# Patient Record
Sex: FEMALE | Race: WHITE | Employment: OTHER | URBAN - METROPOLITAN AREA
[De-identification: names, ages, dates, MRNs, and addresses within clinical notes are randomized per-mention and may not be internally consistent; named-entity substitution may affect disease eponyms.]

---

## 2017-01-06 ENCOUNTER — HOSPITAL ENCOUNTER (OUTPATIENT)
Dept: PHYSICAL THERAPY | Age: 42
Discharge: HOME | End: 2017-01-06
Payer: COMMERCIAL

## 2017-01-06 PROCEDURE — 97140 MANUAL THERAPY 1/> REGIONS: CPT

## 2017-01-06 PROCEDURE — 97014 ELECTRIC STIMULATION THERAPY: CPT

## 2017-01-06 PROCEDURE — 97110 THERAPEUTIC EXERCISES: CPT

## 2017-01-06 PROCEDURE — 97162 PT EVAL MOD COMPLEX 30 MIN: CPT

## 2017-01-06 NOTE — PROGRESS NOTES
PT LUMBAR EVAL AND TREATMENT     Patient Name: Dionisio Padilla  Date:2017  : 1975  [x]  Patient  Verified  Payor: Hamzah Holman / Plan: [place] [employer] / Product Type: Commerical /    In time:1011  Out time:1109  Total Treatment Time (min): 58  Visit #: 1 of 8-12    Treatment Area: Chronic back pain [M54.9, G89.29]    SUBJECTIVE  Pain Level (0-10 scale): (C): 5  (B):   0 (W):  8  Any medication changes, allergies to medications, diagnosis change, or new procedure performed: see summary sheet for update  Subjective functional status/changes  CHIEF COMPLAINT: Patient reports with co LS pain starting approx 2 years ago after heavy lift with twisting of water bottle. Patient has been getting regular massages for several months, myofascial release balls to the L QL which aids relief, stretching and heat but temporarily. Patient also co R ant hip pain likely from compensatory gait.    Functional Status  Present functional limitations: walking tolerance - 1 min before onset of pain, standing tolerance limited, rec activities/ social activities, gym workouts    Mechanism of injury:lifting and twisting   Symptoms:  Aggravated by: see functional limitations   Eased by: heat, massage, stretching     Past History/Treatments:  Nothing for LS  Diagnostic Tests: None     OBJECTIVE  Posture:  Lateral Shift: negative      Gait:  WNL     Active Movements:  ROM % AROM Comments:pain, area   Forward flexion 40-60 WNL     Extension 20-30 WNL Pain in L flank     SB right 20-30     SB left 20-30  Pain in L flank   Rotation right 5-10     Rotation left 5-10  Pain in L flank        Dural Mobility:  Seated slump test negative - denies radiating sx     Palpation LQL     Strength  Hip : NT   Core: pain with 50% bridge    Special Tests    Sacroilliac:  SLIGHT UPSLIP IN SUPINE L     Standing forward flexion test: slight hypomobility of L     Crests: WNL              Hip: Beth Test NT    Piriformis: Mild L           Deficits: Franco's: NT     Luigi: NT     Hamstrings 90/90: WNL     Gastrocsoleus WNL     Other tests/comments:    Manual 13 min: IMT to L QL - multiple locations   Time out and written consent complete at 1030 am      Modality (rationale): decrease m tension   [x]  E-Stim: type HV 4 leads to L QL  x 10 min        [x]w/heat unattended semi reclined       Patient Education: [x]Established HEP , IMT post treatment recovery    [x] POT  (minutes) :10    Pain Level (0-10 scale) post treatment: 5    ASSESSMENT  [x]  See Plan of Care    PLAN  [x]  Upgrade activities as tolerated      [x] Other:_  POC 2x 8-12  Parker Alexandria, PT 1/6/2017  6:35 AM    Justification for Eval Code Complexity:  Patient History : chronicity   Examination see exam as above   Clinical Presentation: evolving - progressive worsening   Clinical Decision Making : FOTO 57/100

## 2017-01-06 NOTE — PROGRESS NOTES
[address] [facility] [address] [facility] AT [address] [place], [place]  Phone: [phone] Fax: [phone] / STATEMENT OF MEDICAL NECESSITY FOR PHYSICAL THERAPY SERVICES  Patient Name: Nany Ryan : 1975   Medical   Diagnosis: Chronic back pain [M54.9, G89.29] Treatment Diagnosis: L QL pain/ LS pain    Onset Date: approx 2 years ago      Referral Source: Cheryl Cooper MD Start of Care [facility]): 2017   Prior Hospitalization: See medical history Provider #: [id number]   Prior Level of Function: Functional I    Comorbidities: Depression   Medications: Verified on Patient Summary List   The Plan of Care and following information is based on the information from the initial evaluation.   ========================================================================  Assessment / key information:  Pt is a 46y.o. year old female who presents with co LS pain starting approx 2 years ago after heavy lift with twisting of water bottle. Patient has been getting regular massages for several months, myofascial release balls to the L QL which aids relief, stretching and heat but temporarily. Patient also co R ant hip pain likely from compensatory gait. Current deficits include: increased pain to 8/10 at worst, L QL tautness and tenderness causing mild L upslip, no pelvic rotations, mild piriformis tightness compared to R, poor core strength sec to pain. Functional deficits include: walking tolerance - 1 min before onset of pain, standing tolerance limited, rec activities/ social activities, gym workouts . Home exercise program initiated on initial evaluation to address these deficits.  Pt would benefit from PT to address these deficits for increased functional mobility and QOL.  ========================================================================  Eval Complexity: History: MEDIUM  Complexity : 1-2 comorbidities / personal factors will impact the outcome/ POC Exam:HIGH Complexity : 4+ Standardized tests and measures addressing body structure, function, activity limitation and / or participation in recreation  Presentation: MEDIUM Complexity : Evolving with changing characteristics  Clinical Decision Making:MEDIUM Complexity : FOTO score of 26-74Overall Complexity:MEDIUM  Problem List: pain affecting function, decrease ROM, decrease strength, edema affecting function, impaired gait/ balance, decrease ADL/ functional abilitiies, decrease activity tolerance, decrease flexibility/ joint mobility and other FOTO 57/100   Treatment Plan may include any combination of the following: Therapeutic exercise, Neuromuscular re-education, Physical agent/modality, Gait/balance training, Manual therapy, Aquatic therapy, Patient education, Self Care training and Other: IMT/. dry needling   Patient / Family readiness to learn indicated by: asking questions, trying to perform skills and interest  Persons(s) to be included in education: patient (P)  Barriers to Learning/Limitations: None  Measures taken:    Patient Goal (s): \"total pain relief \"   Patient self reported health status: good  Rehabilitation Potential: good   Short Term Goals: To be accomplished in  1  weeks:  1. Pt will be independent and compliant with HEP   Long Term Goals: To be accomplished in  8-12  treatments:  1. Patient will increase FOTO score to 68/100 for indications of increased functional mobility. 2.  Patient will demo 100% bridge with pain less than 3/10 for improved core stability   3. Patient will demo equal pelvic ht in supine to indicate decreased QL tightness and pain   4.  Patient will report increased walking tolerance to 10 min with minimal pain for progression of activity tolerance  Frequency / Duration:   Patient to be seen  2  times per week for 8-12  treatments:  Patient / Caregiver education and instruction: self care, activity modification and exercises  G-Codes (GP): NA  Therapist Signature: Annmarie Lexus, PT Date: 7/7/9002   Certification Period: NA Time: 6:35 AM   ========================================================================  I certify that the above Physical Therapy Services are being furnished while the patient is under my care. I agree with the treatment plan and certify that this therapy is necessary. Physician Signature:        Date:       Time:   Please sign and return to [facility] or you may fax the signed copy to [phone]. Thank you.

## 2017-01-13 ENCOUNTER — HOSPITAL ENCOUNTER (OUTPATIENT)
Dept: PHYSICAL THERAPY | Age: 42
Discharge: HOME | End: 2017-01-13
Payer: COMMERCIAL

## 2017-01-13 PROCEDURE — 97014 ELECTRIC STIMULATION THERAPY: CPT

## 2017-01-13 PROCEDURE — 97140 MANUAL THERAPY 1/> REGIONS: CPT

## 2017-01-13 PROCEDURE — 97110 THERAPEUTIC EXERCISES: CPT

## 2017-01-13 NOTE — PROGRESS NOTES
PT DAILY TREATMENT NOTE     Patient Name: Belma Goltz  Date:2017  : 1975  [x]  Patient  Verified  Payor: Serge Casper / Plan: [place] [employer]  [employer] / Product Type: Commerical /    In time:759  Out time:904  Total Treatment Time (min): 65  Visit #: 2 of     Treatment Area: Chronic back pain [M54.9, G89.29]    SUBJECTIVE  Pain Level (0-10 scale): 3-4  Any medication changes, allergies to medications, adverse drug reactions, diagnosis change, or new procedure performed?: [x] No    [] Yes (see summary sheet for update)  Subjective functional status/changes:   [] No changes reported  \"I took my dog for an hour walk without pain. I still have a dull ache. \"    OBJECTIVE  Modality rationale: decrease pain and increase tissue extensibility to improve the patients ability to decrease myofascial restriction to decrease pain with activity    Min Type Additional Details   10 [x] Estim: 4 lead HV to L QL  []w/US   []w/ice   [x]w/heat  Position: semi reclined  Location: L QL    [x] Skin assessment post-treatment:  [x]intact [x]redness- no adverse reaction       []redness - adverse reaction:      Procedure: An intramuscular manual therapy (dry needling) and a neuro-muscular re-education treatment was done to deactivate myofascial trigger points with a 30 gauge filament needle under aseptic technique.     Indications:  [x] Myofascial pain and dysfunction [x] Muscled spasms  [x] Myalgia/myositis   [x] Muscle cramps  [x] Muscle imbalances   [] TMD  [] Other:  Time Out Performed (verify consent and verify the right body part): 2017  Time: 800am  Informed consent obtained: [x] Verbal [] Written    The following items were reviewed with the patient:  [x] Purpose of dry needling, side effects, possible complications and the informed consent  [x] The need to report the use of blood thinners and/or immunosuppressant medications  [x] How to respond to possible adverse effects of treatment  [x] Self treatment of post needling soreness: heat, (moist heat, heat wraps) and stretching  [x] Opportunity was given to ask any questions, all questions were answered    30 min Therapeutic Exercise:  [x] See flow sheet :   Rationale: increase ROM, increase strength and improve flexibility and core stability  to improve the patients ability to decreased pain with activity and progress to PLOF     25 min Manual Therapy:  IMT - L QL, glut max    Rationale: increase ROM, increase tissue extensibility and decrease trigger points to decrease myofascial restrictions contributing to pain    x min Patient Education: [x] Review HEP    [x] IMT consent and post needling care to include heat, stretching/ exercise and prescribed pain meds as needed        Other Objective/Functional Measures:     Patient reports IMT last session aided in amb with he dog for 1 hour without severe pain    STG assessed    Initiated therex per FS - first session after IE    Over-activation of Rect Abdominals with TA require VCing      Pain Level (0-10 scale) post treatment: 4    ASSESSMENT/Changes in Function: Patient tolerated treatment fair - increased tightness after IMT. Pain present with APT - therefore held this exercise on SB. Patient require VCing to avoid overstretching. Patient's response to today's treatment:  [x] LTR's  [] Muscle relaxation [] Pain Relief  [x] Post needling soreness [x] without complications  [] Increased ROM [] Decreased HA's  [] Decreased Tinnitus  [] Other:     Patient will continue to benefit from skilled PT services to modify and progress therapeutic interventions, address functional mobility deficits, address ROM deficits, address strength deficits, analyze and address soft tissue restrictions, analyze and cue movement patterns, analyze and modify body mechanics/ergonomics and assess and modify postural abnormalities to attain remaining goals.      [x]  See Plan of Care  []  See progress note/recertification  []  See Discharge Summary         Progress towards goals / Updated goals: · Short Term Goals: To be accomplished in 1 weeks:  1. Pt will be independent and compliant with HEP- Goal progressing - HEP est with no questions. HEP will be modified and progressed as appropriate 1/13/17  · Long Term Goals: To be accomplished in 8-12 treatments:  1. Patient will increase FOTO score to 68/100 for indications of increased functional mobility. 2. Patient will demo 100% bridge with pain less than 3/10 for improved core stability   3. Patient will demo equal pelvic ht in supine to indicate decreased QL tightness and pain   4.  Patient will report increased walking tolerance to 10 min with minimal pain for progression of activity tolerance  Frequency / Duration:   Patient to be seen 2 times per week for 8-12 treatments:    PLAN  [x]  Upgrade activities as tolerated     []  Continue plan of care  [x]  Update interventions per flow sheet       []  Discharge due to:_  [x]  Other:_assess tolerance to first therex treatment with IMT     Add piriformis and LS paraspinal IMT     Teresa Llamas, PT 1/13/2017  6:31 AM

## 2017-01-16 ENCOUNTER — HOSPITAL ENCOUNTER (OUTPATIENT)
Dept: PHYSICAL THERAPY | Age: 42
Discharge: HOME | End: 2017-01-16
Payer: COMMERCIAL

## 2017-01-16 PROCEDURE — 97140 MANUAL THERAPY 1/> REGIONS: CPT

## 2017-01-16 PROCEDURE — 97110 THERAPEUTIC EXERCISES: CPT

## 2017-01-16 PROCEDURE — 97014 ELECTRIC STIMULATION THERAPY: CPT

## 2017-01-16 NOTE — PROGRESS NOTES
PT DAILY TREATMENT NOTE     Patient Name: Nany Ryan  Date:2017  : 1975  [x]  Patient  Verified  Payor: Jarrett Goddard / Plan: [place] [employer]  [employer] PT / Product Type: Commerical /    In time:1100 Out time:1208  Total Treatment Time (min): 68  Visit #: 3 of     Treatment Area: Chronic back pain [M54.9, G89.29]    SUBJECTIVE  Pain Level (0-10 scale): 3-4  Any medication changes, allergies to medications, adverse drug reactions, diagnosis change, or new procedure performed?: [x] No    [] Yes (see summary sheet for update)  Subjective functional status/changes:   [] No changes reported  \"I was more sore after last session. \"    OBJECTIVE  Modality rationale: decrease pain and increase tissue extensibility to improve the patients ability to decrease myofascial restriction to decrease pain with activity    Min Type Additional Details   10 [x] Estim: 4 lead HV to L QL  []w/US   []w/ice   [x]w/heat  Position: semi reclined  Location: L QL    [x] Skin assessment post-treatment:  [x]intact [x]redness- no adverse reaction       []redness - adverse reaction:      Procedure: An intramuscular manual therapy (dry needling) and a neuro-muscular re-education treatment was done to deactivate myofascial trigger points with a 30 gauge filament needle under aseptic technique.     Indications:  [x] Myofascial pain and dysfunction [x] Muscled spasms  [x] Myalgia/myositis   [x] Muscle cramps  [x] Muscle imbalances   [] TMD  [] Other:  Time Out Performed (verify consent and verify the right body part): 2017  Time: 1100am  Informed consent obtained: [x] Verbal [] Written    The following items were reviewed with the patient:  [x] Purpose of dry needling, side effects, possible complications and the informed consent  [x] The need to report the use of blood thinners and/or immunosuppressant medications  [x] How to respond to possible adverse effects of treatment  [x] Self treatment of post needling soreness: heat, (moist heat, heat wraps) and stretching  [x] Opportunity was given to ask any questions, all questions were answered    38 min Therapeutic Exercise:  [x] See flow sheet :   Rationale: increase ROM, increase strength and improve flexibility and core stability  to improve the patients ability to decreased pain with activity and progress to PLOF     20 min Manual Therapy: IMT: LS and sacral paraspinal    Rationale: increase ROM, increase tissue extensibility and decrease trigger points to decrease myofascial restrictions contributing to pain    x min Patient Education: [x] Review HEP    [x] IMT consent and post needling care to include heat, stretching/ exercise and prescribed pain meds as needed        Other Objective/Functional Measures:     Patient reports first FU treatment did not cause adverse reaction other than normal IMT post treatment soreness     Added BKFO and quad multifidi to therex      Pain Level (0-10 scale) post treatment: 4    ASSESSMENT/Changes in Function: Patient tolerated new therex well. Good control with un resisted BKFO, but co back \"spasming. \"  Excellent LTR of paraspinals today. Patient's response to today's treatment:  [x] LTR's  [] Muscle relaxation [] Pain Relief  [x] Post needling soreness [x] without complications  [] Increased ROM [] Decreased HA's  [] Decreased Tinnitus  [] Other:     Patient will continue to benefit from skilled PT services to modify and progress therapeutic interventions, address functional mobility deficits, address ROM deficits, address strength deficits, analyze and address soft tissue restrictions, analyze and cue movement patterns, analyze and modify body mechanics/ergonomics and assess and modify postural abnormalities to attain remaining goals. [x]  See Plan of Care  []  See progress note/recertification  []  See Discharge Summary         Progress towards goals / Updated goals:  All goals reviewed and progressing appropriately as of 1/16/2017   · Short Term Goals: To be accomplished in 1 weeks:  1. Pt will be independent and compliant with HEP- Goal progressing - HEP est with no questions. HEP will be modified and progressed as appropriate 1/13/17  · Long Term Goals: To be accomplished in 8-12 treatments:  1. Patient will increase FOTO score to 68/100 for indications of increased functional mobility. 2. Patient will demo 100% bridge with pain less than 3/10 for improved core stability   3. Patient will demo equal pelvic ht in supine to indicate decreased QL tightness and pain   4.  Patient will report increased walking tolerance to 10 min with minimal pain for progression of activity tolerance    PLAN  [x]  Upgrade activities as tolerated     []  Continue plan of care  [x]  Update interventions per flow sheet       []  Discharge due to:_  [x]  Other:_assess tolerance to paraspinal release     Henry Hampton, PT 1/16/2017  6:31 AM

## 2017-01-20 ENCOUNTER — APPOINTMENT (OUTPATIENT)
Dept: PHYSICAL THERAPY | Age: 42
End: 2017-01-20
Payer: COMMERCIAL

## 2017-01-23 ENCOUNTER — HOSPITAL ENCOUNTER (OUTPATIENT)
Dept: PHYSICAL THERAPY | Age: 42
Discharge: HOME | End: 2017-01-23
Payer: COMMERCIAL

## 2017-01-23 PROCEDURE — 97140 MANUAL THERAPY 1/> REGIONS: CPT

## 2017-01-23 PROCEDURE — 97014 ELECTRIC STIMULATION THERAPY: CPT

## 2017-01-23 PROCEDURE — 97110 THERAPEUTIC EXERCISES: CPT

## 2017-01-23 NOTE — PROGRESS NOTES
PT DAILY TREATMENT NOTE     Patient Name: Riley Myers  Date:2017  : 1975  [x]  Patient  Verified  Payor: Odilon Wagner / Plan: [place] [employer]  CAPITATED PT / Product Type: Commerical /    In time:830 Out time:917  Total Treatment Time (min): 47  Visit #: 4 of     Treatment Area: Chronic back pain [M54.9, G89.29]    SUBJECTIVE  Pain Level (0-10 scale):3-4  Any medication changes, allergies to medications, adverse drug reactions, diagnosis change, or new procedure performed?: [x] No    [] Yes (see summary sheet for update)  Subjective functional status/changes:   [] No changes reported  \"Walking is still better, but the pain is ever present. \"    OBJECTIVE  Modality rationale: decrease pain and increase tissue extensibility to improve the patients ability to decrease myofascial restriction to decrease pain with activity    Min Type Additional Details   10 [x] Estim: 4 lead HV to L QL  []w/US   []w/ice   [x]w/heat  Position: prone  Location: L QL    [x] Skin assessment post-treatment:  [x]intact [x]redness- no adverse reaction       []redness - adverse reaction:      Procedure: An intramuscular manual therapy (dry needling) and a neuro-muscular re-education treatment was done to deactivate myofascial trigger points with a 30 gauge filament needle under aseptic technique.     Indications:  [x] Myofascial pain and dysfunction [x] Muscled spasms  [x] Myalgia/myositis   [x] Muscle cramps  [x] Muscle imbalances   [] TMD  [] Other:  Time Out Performed (verify consent and verify the right body part): 2017  Time: 830  Informed consent obtained: [x] Verbal [] Written    The following items were reviewed with the patient:  [x] Purpose of dry needling, side effects, possible complications and the informed consent  [x] The need to report the use of blood thinners and/or immunosuppressant medications  [x] How to respond to possible adverse effects of treatment  [x] Self treatment of post needling soreness: heat, (moist heat, heat wraps) and stretching  [x] Opportunity was given to ask any questions, all questions were answered    22 min Therapeutic Exercise:  [x] See flow sheet :   Rationale: increase ROM, increase strength and improve flexibility and core stability  to improve the patients ability to decreased pain with activity and progress to PLOF     15 min Manual Therapy: IMT: LS paraspinals, L QL     Rationale: increase ROM, increase tissue extensibility and decrease trigger points to decrease myofascial restrictions contributing to pain    x min Patient Education: [x] Review HEP    [x] IMT consent and post needling care to include heat, stretching/ exercise and prescribed pain meds as needed        Other Objective/Functional Measures:     Patient had 1 week recovery since last IMT session and reports she was sick at the end of last week    LTG 2- Bridge ROm - 50% with min pain, 100% with increased pain    Reported improved walking tolerance       Pain Level (0-10 scale) post treatment: 4 post needling soreness    ASSESSMENT/Changes in Function: Patient tolerated treatment well today - excellent LTR of L QL. Patient tolerate stretching better indicating improved myofascial mobility. TC on some therex sec to work      Patient's response to today's treatment:  [x] LTR's  [] Muscle relaxation [] Pain Relief  [x] Post needling soreness [x] without complications  [] Increased ROM [] Decreased HA's  [] Decreased Tinnitus  [] Other:     Patient will continue to benefit from skilled PT services to modify and progress therapeutic interventions, address functional mobility deficits, address ROM deficits, address strength deficits, analyze and address soft tissue restrictions, analyze and cue movement patterns, analyze and modify body mechanics/ergonomics and assess and modify postural abnormalities to attain remaining goals.      [x]  See Plan of Care  []  See progress note/recertification  []  See Discharge Summary Progress towards goals / Updated goals: All goals reviewed and progressing appropriately as of 1/23/2017   · Short Term Goals: To be accomplished in 1 weeks:  1. Pt will be independent and compliant with HEP- Goal progressing - HEP est with no questions. HEP will be modified and progressed as appropriate 1/13/17  · Long Term Goals: To be accomplished in 8-12 treatments:  1. Patient will increase FOTO score to 68/100 for indications of increased functional mobility. 2. Patient will demo 100% bridge with pain less than 3/10 for improved core stability - goal progressing - bridge 50% without pain (50% with increased pain at IE)  1/23/17  3. Patient will demo equal pelvic ht in supine to indicate decreased QL tightness and pain   4.  Patient will report increased walking tolerance to 10 min with minimal pain for progression of activity tolerance    PLAN  [x]  Upgrade activities as tolerated     []  Continue plan of care  [x]  Update interventions per flow sheet       []  Discharge due to:_  [x]  Other: cont to progress core strength as tolerated - add ELIZA Zhang, PT 1/23/2017  6:31 AM

## 2017-01-25 ENCOUNTER — APPOINTMENT (OUTPATIENT)
Dept: PHYSICAL THERAPY | Age: 42
End: 2017-01-25
Payer: COMMERCIAL

## 2017-01-27 ENCOUNTER — HOSPITAL ENCOUNTER (OUTPATIENT)
Dept: PHYSICAL THERAPY | Age: 42
End: 2017-01-27
Payer: COMMERCIAL

## 2017-01-30 ENCOUNTER — HOSPITAL ENCOUNTER (OUTPATIENT)
Dept: PHYSICAL THERAPY | Age: 42
Discharge: HOME | End: 2017-01-30
Payer: COMMERCIAL

## 2017-01-30 PROCEDURE — 97140 MANUAL THERAPY 1/> REGIONS: CPT

## 2017-01-30 PROCEDURE — 97110 THERAPEUTIC EXERCISES: CPT

## 2017-01-30 PROCEDURE — 97014 ELECTRIC STIMULATION THERAPY: CPT

## 2017-02-01 ENCOUNTER — HOSPITAL ENCOUNTER (OUTPATIENT)
Dept: PHYSICAL THERAPY | Age: 42
Discharge: HOME | End: 2017-02-01
Payer: COMMERCIAL

## 2017-02-01 PROCEDURE — 97110 THERAPEUTIC EXERCISES: CPT

## 2017-02-01 PROCEDURE — 97140 MANUAL THERAPY 1/> REGIONS: CPT

## 2017-02-01 PROCEDURE — 97014 ELECTRIC STIMULATION THERAPY: CPT

## 2017-02-01 NOTE — PROGRESS NOTES
PT DAILY TREATMENT NOTE     Patient Name: Nany Ryan  Date:2017  : 1975  [x]  Patient  Verified  Payor: Jarrett Goddard / [place]: [place] [employer]  [facility] / Product Type: Commerical /    In time: 65 Out time:920  Total Treatment Time (min): 45  Visit #: 6 of     Treatment Area: Chronic back pain [M54.9, G89.29]    SUBJECTIVE  Pain Level (0-10 scale):4-5  Any medication changes, allergies to medications, adverse drug reactions, diagnosis change, or new procedure performed?: [x] No    [] Yes (see summary sheet for update)  Subjective functional status/changes:   [] No changes reported  \"I just don't feel well\" patient co back pain, body aches and migraine      OBJECTIVE  Modality rationale: decrease pain and increase tissue extensibility to improve the patients ability to decrease myofascial restriction to decrease pain with activity    Min Type Additional Details   10 [x] Estim: 4 lead HV  []w/US   []w/ice   [x]w/heat  Position: prone  Location: L piriformis and LS paraspinals    [x] Skin assessment post-treatment:  [x]intact [x]redness- no adverse reaction       []redness - adverse reaction:      Procedure: An intramuscular manual therapy (dry needling) and a neuro-muscular re-education treatment was done to deactivate myofascial trigger points with a 30 gauge filament needle under aseptic technique.     Indications:  [x] Myofascial pain and dysfunction [x] Muscled spasms  [x] Myalgia/myositis   [x] Muscle cramps  [x] Muscle imbalances   [] TMD  [] Other:  Time Out Performed (verify consent and verify the right body part): 2017  Time: 2722  Informed consent obtained: [x] Verbal [] Written    The following items were reviewed with the patient:  [x] Purpose of dry needling, side effects, possible complications and the informed consent  [x] The need to report the use of blood thinners and/or immunosuppressant medications  [x] How to respond to possible adverse effects of treatment  [x] Self treatment of post needling soreness: heat, (moist heat, heat wraps) and stretching  [x] Opportunity was given to ask any questions, all questions were answered    10 min Therapeutic Exercise:  [x] See flow sheet : reassess   Rationale: increase ROM, increase strength and improve flexibility and core stability  to improve the patients ability to decreased pain with activity and progress to PLOF     20 min Manual Therapy: IMT- L piriformis, L glut max, L glut med, LS and sacral PA mobs (pain), sacral float   Rationale: increase ROM, increase tissue extensibility and decrease trigger points to decrease myofascial restrictions contributing to pain    x min Patient Education: [x] Review HEP    [x] IMT consent and post needling care to include heat, stretching/ exercise and prescribed pain meds as needed  - resume stretching 1x per day       Other Objective/Functional Measures:     Goals assessed for PN   Pain at best 0/10, at worst 5-6/10  Subjective % improvement: 40%  Objective: LS AROM pain with flexion, extension and rotation/ SB to L    Pelvic ht equal in standing and supine    Bridge 75% with pain    R figure 4 increase L SI pain   Improvements: walking tolerance: 50% improved: 2 blocks to 1.5 miles , increased pain at onset, but subsides, improved standing tolerance    Deficits cont pain with walking     Added rib integrator and open book for TS restriction noted last session      Pain Level (0-10 scale) post treatment: 4 post needling soreness    ASSESSMENT/Changes in Function: SEE PN      Patient's response to today's treatment:  [x] LTR's  [] Muscle relaxation [] Pain Relief  [x] Post needling soreness [x] without complications  [] Increased ROM [] Decreased HA's  [] Decreased Tinnitus  [] Other:     Patient will continue to benefit from skilled PT services to modify and progress therapeutic interventions, address functional mobility deficits, address ROM deficits, address strength deficits, analyze and address soft tissue restrictions, analyze and cue movement patterns, analyze and modify body mechanics/ergonomics and assess and modify postural abnormalities to attain remaining goals. []  See Plan of Care  [x]  See progress note/recertification 1/2/40  []  See Discharge Summary         Progress towards goals / Updated goals: · Short Term Goals: To be accomplished in 1 weeks:  1. Pt will be independent and compliant with HEP- goal met - patient reports daily compliance with HEP   · Long Term Goals: To be accomplished in 8-12 treatments:  1. Patient will increase FOTO score to 68/100 for indications of increased functional mobility - goal not met - FOTO unchanged from IE at 57/100   2. Patient will demo 100% bridge with pain less than 3/10 for improved core stability - goal progressing - 75% bridge (improved from no bridge at IE)  3. Patient will demo equal pelvic ht in supine to indicate decreased QL tightness and pain - goal met - equal pelvic ht in standing and supine   4.  Patient will report increased walking tolerance to 10 min with minimal pain for progression of activity tolerance - goal met - patient reports 2 block to 1.5 mile tolerance     PLAN  [x]  Upgrade activities as tolerated     []  Continue plan of care  [x]  Update interventions per flow sheet       []  Discharge due to:_  [x]  Other:SEE Pn for continuation     Dalila Puri PT 2/1/2017  6:31 AM

## 2017-02-01 NOTE — PROGRESS NOTES
[address] [facility] AT [address] [place], [place]  Phone: [phone] Fax: [phone]  PROGRESS NOTE  Patient Name: Cole Talbert : 1975   Treatment/Medical Diagnosis: Chronic back pain [M54.9, G89.29]   Referral Source: Kristine Romeo MD     Date of Initial Visit: 17 Attended Visits: 6 Missed Visits: 1     SUMMARY OF TREATMENT  Patient is being treated for co LS pain starting approx 2 years ago after heavy lift with twisting of water bottle. Treatment has included IMT/ dry needling therapy, progressive therex and HV ES for pain relief  CURRENT STATUS  Patient is making good steady progress with PT. Patient has excellent first response to IMT and reported improved amb tolerance. Patient cont to have intermittent pain that limits comfort and mobility. Assessment as follows:  Pain at best 0/10, at worst 5-6/10  Subjective % improvement: 40%  Objective: LS AROM pain with flexion, extension and rotation/ SB to L   Pelvic ht equal in standing and supine   Bridge 75% with pain   R figure 4 increase L SI pain   Improvements: walking tolerance: 50% improved: 2 blocks to 1.5 miles , increased pain at onset, but subsides, improved standing tolerance   Deficits cont pain with walking   · Short Term Goals: To be accomplished in 1 weeks:  1. Pt will be independent and compliant with HEP- goal met - patient reports daily compliance with HEP   · Long Term Goals: To be accomplished in 8-12 treatments:  1. Patient will increase FOTO score to 68/100 for indications of increased functional mobility - goal not met - FOTO unchanged from IE at 57/100   2. Patient will demo 100% bridge with pain less than 3/10 for improved core stability - goal progressing - 75% bridge (improved from no bridge at IE)  3. Patient will demo equal pelvic ht in supine to indicate decreased QL tightness and pain - goal met - equal pelvic ht in standing and supine   4. Patient will report increased walking tolerance to 10 min with minimal pain for progression of activity tolerance - goal met - patient reports 2 block to 1.5 mile tolerance   New Goals to be achieved in __4-12__  treatments:  1. Patient will increase FOTO score to 68/100 for indications of increased functional mobility    2. Patient will demo 100% bridge to indicate improved core strength    3. Patient will report 60% improvement in sx for progression to PLOF    4. Patient will report walking tolerance to 2 miles for increased community mobility      G-Codes: NA  RECOMMENDATIONS  Patient would benefit from continuation 1-2x per week for 4-12 sessions as needed to cont to address myofascial trigger points and address deficits as above. If you have any questions/comments please contact us directly at [phone]. Thank you for allowing us to assist in the care of your patient. Therapist Signature: Yumiko Hines, MELANI Date: 2/1/2017     Time: 6:30 AM   NOTE TO PHYSICIAN:  PLEASE COMPLETE THE ORDERS BELOW AND FAX TO   [facility]: [phone]. If you are unable to process this request in 24 hours please contact our office: [phone].  ___ I have read the above report and request that my patient continue as recommended.   ___ I have read the above report and request that my patient continue therapy with the following changes/special instructions:_________________________________________________________   ___ I have read the above report and request that my patient be discharged from therapy.      Physician Signature:        Date:       Time:

## 2017-02-10 ENCOUNTER — APPOINTMENT (OUTPATIENT)
Dept: PHYSICAL THERAPY | Age: 42
End: 2017-02-10
Payer: COMMERCIAL

## 2017-02-17 ENCOUNTER — HOSPITAL ENCOUNTER (OUTPATIENT)
Dept: PHYSICAL THERAPY | Age: 42
Discharge: HOME | End: 2017-02-17
Payer: COMMERCIAL

## 2017-02-17 PROCEDURE — 97014 ELECTRIC STIMULATION THERAPY: CPT

## 2017-02-17 PROCEDURE — 97110 THERAPEUTIC EXERCISES: CPT

## 2017-02-17 PROCEDURE — 97140 MANUAL THERAPY 1/> REGIONS: CPT

## 2017-02-17 NOTE — PROGRESS NOTES
PT DAILY TREATMENT NOTE     Patient Name: Carlene Query  Date:2017  : 1975  [x]  Patient  Verified  Payor: Vickey Burnham [facility] / Product Type: Commerical /    In time: 733 Out time:824  Total Treatment Time (min): 51  Visit #: 1 of -    Treatment Area: Chronic back pain [M54.9, G89.29]    SUBJECTIVE  Pain Level (0-10 scale):1/10  Any medication changes, allergies to medications, adverse drug reactions, diagnosis change, or new procedure performed?: [x] No    [] Yes (see summary sheet for update)  Subjective functional status/changes:   [] No changes reported  \"I had a lot of relief after last time. I have been so busy and I am having adverse reaction to the botox in my neck. \"     OBJECTIVE  Modality rationale: decrease pain and increase tissue extensibility to improve the patients ability to decrease myofascial restriction to decrease pain with activity    Min Type Additional Details   10 [x] Estim: 4 lead HV  []w/US   []w/ice   [x]w/heat  Position: prone  Location: L piriformis and LS paraspinals    [x] Skin assessment post-treatment:  [x]intact [x]redness- no adverse reaction       []redness - adverse reaction:      Procedure: An intramuscular manual therapy (dry needling) and a neuro-muscular re-education treatment was done to deactivate myofascial trigger points with a 30 gauge filament needle under aseptic technique.     Indications:  [x] Myofascial pain and dysfunction [x] Muscled spasms  [x] Myalgia/myositis   [x] Muscle cramps  [x] Muscle imbalances   [] TMD  [] Other:  Time Out Performed (verify consent and verify the right body part): 2017  Time: 733  Informed consent obtained: [x] Verbal [] Written    The following items were reviewed with the patient:  [x] Purpose of dry needling, side effects, possible complications and the informed consent  [x] The need to report the use of blood thinners and/or immunosuppressant medications  [x] How to respond to possible adverse effects of treatment  [x] Self treatment of post needling soreness: heat, (moist heat, heat wraps) and stretching  [x] Opportunity was given to ask any questions, all questions were answered    16 min Therapeutic Exercise:  [x] See flow sheet :    Rationale: increase ROM, increase strength and improve flexibility and core stability  to improve the patients ability to decreased pain with activity and progress to PLOF     25 min Manual Therapy: IMT-L T11-12 intercostals and Iliocostalis, L glut max and piriformis   Rationale: increase ROM, increase tissue extensibility and decrease trigger points to decrease myofascial restrictions contributing to pain    x min Patient Education: [x] Review HEP    [x] IMT consent and post needling care to include heat, stretching/ exercise and prescribed pain meds as needed  - resume HEP       Other Objective/Functional Measures:     Increased walking tolerance   HEP compliance - limited sec to work TC       Pain Level (0-10 scale) post treatment: 2 post needling soreness     ASSESSMENT/Changes in Function: Patient not seen in 2 weeks sec to hectic work schedule. Excellent LTR of needled muscles today. Added open book and rib integrator to address TS restrictions     Patient's response to today's treatment:  [x] LTR's  [] Muscle relaxation [] Pain Relief  [x] Post needling soreness [x] without complications  [] Increased ROM [] Decreased HA's  [] Decreased Tinnitus  [] Other:     Patient will continue to benefit from skilled PT services to modify and progress therapeutic interventions, address functional mobility deficits, address ROM deficits, address strength deficits, analyze and address soft tissue restrictions, analyze and cue movement patterns, analyze and modify body mechanics/ergonomics and assess and modify postural abnormalities to attain remaining goals.      []  See Plan of Care  [x]  See progress note/recertification 2/3/20  []  See Discharge Summary Progress towards goals / Updated goals: All goals reviewed and progressing appropriately as of 2/17/2017  1. Patient will increase FOTO score to 68/100 for indications of increased functional mobility    2. Patient will demo 100% bridge to indicate improved core strength    3. Patient will report 60% improvement in sx for progression to PLOF    4.   Patient will report walking tolerance to 2 miles for increased community mobility          PLAN  [x]  Upgrade activities as tolerated     []  Continue plan of care  [x]  Update interventions per flow sheet       []  Discharge due to:_  [x]  Other: cont 1x4      Aron Centeno, PT 2/17/2017  6:31 AM

## 2017-02-24 ENCOUNTER — APPOINTMENT (OUTPATIENT)
Dept: PHYSICAL THERAPY | Age: 42
End: 2017-02-24
Payer: COMMERCIAL

## 2017-02-27 ENCOUNTER — HOSPITAL ENCOUNTER (OUTPATIENT)
Dept: PHYSICAL THERAPY | Age: 42
Discharge: HOME | End: 2017-02-27
Payer: COMMERCIAL

## 2017-02-27 PROCEDURE — 97140 MANUAL THERAPY 1/> REGIONS: CPT

## 2017-02-27 PROCEDURE — 97110 THERAPEUTIC EXERCISES: CPT

## 2017-02-27 PROCEDURE — 97014 ELECTRIC STIMULATION THERAPY: CPT

## 2017-02-27 NOTE — PROGRESS NOTES
PT DAILY TREATMENT NOTE 8-    Patient Name: Shantel Sheppard  Date:2017  : 1975  [x]  Patient  Verified  Payor: Kirti Mckeon / [place]: [place] [employer]  [facility] / Product Type: Commerical /    In time: 900 Out time:1004  Total Treatment Time (min): 64  Visit #: 2 of 4-    Treatment Area: Chronic back pain [M54.9, G89.29]    SUBJECTIVE  Pain Level (0-10 scale):4/10  Any medication changes, allergies to medications, adverse drug reactions, diagnosis change, or new procedure performed?: [x] No    [] Yes (see summary sheet for update)  Subjective functional status/changes:   [] No changes reported  \"Feeling depressed. \"     OBJECTIVE  Modality rationale: decrease pain and increase tissue extensibility to improve the patients ability to decrease myofascial restriction to decrease pain with activity    Min Type Additional Details   10 [x] Estim:IFC  []w/US   []w/ice   [x]w/heat  Position: prone  Location:L QL    [x] Skin assessment post-treatment:  [x]intact [x]redness- no adverse reaction       []redness - adverse reaction:      Procedure: An intramuscular manual therapy (dry needling) and a neuro-muscular re-education treatment was done to deactivate myofascial trigger points with a 30 gauge filament needle under aseptic technique.     Indications:  [x] Myofascial pain and dysfunction [x] Muscled spasms  [x] Myalgia/myositis   [x] Muscle cramps  [x] Muscle imbalances   [] TMD  [] Other:  Time Out Performed (verify consent and verify the right body part): 2017  Time: 900am  Informed consent obtained: [x] Verbal [] Written    The following items were reviewed with the patient:  [x] Purpose of dry needling, side effects, possible complications and the informed consent  [x] The need to report the use of blood thinners and/or immunosuppressant medications  [x] How to respond to possible adverse effects of treatment  [x] Self treatment of post needling soreness: heat, (moist heat, heat wraps) and stretching  [x] Opportunity was given to ask any questions, all questions were answered    34 min Therapeutic Exercise:  [x] See flow sheet :    Rationale: increase ROM, increase strength and improve flexibility and core stability  to improve the patients ability to decreased pain with activity and progress to PLOF     20 min Manual Therapy: IMT-L QL multiple location, DTM post needling, prone TS/ LS PA mobs for mobility    Rationale: increase ROM, increase tissue extensibility and decrease trigger points to decrease myofascial restrictions contributing to pain    x min Patient Education: [x] Review HEP    [x] IMT consent and post needling care to include heat, stretching/ exercise and prescribed pain meds as needed  -10 min exercise in am, 10 min exercise in PM       Other Objective/Functional Measures:     LTG 2 - bridge - pain with 50% bridge      Pain Level (0-10 scale) post treatment: 4    ASSESSMENT/Changes in Function: Patient presents with increased pain levels today. Reports no change to activity, however decrease in HEP compliance sec to TC. Patient encouraged to re initiated HEP and added PPU to address possible structural / vertebral locking. Excellent LTR of QL today. Patient's response to today's treatment:  [x] LTR's  [] Muscle relaxation [] Pain Relief  [x] Post needling soreness [x] without complications  [] Increased ROM [] Decreased HA's  [] Decreased Tinnitus  [] Other:     Patient will continue to benefit from skilled PT services to modify and progress therapeutic interventions, address functional mobility deficits, address ROM deficits, address strength deficits, analyze and address soft tissue restrictions, analyze and cue movement patterns, analyze and modify body mechanics/ergonomics and assess and modify postural abnormalities to attain remaining goals.      []  See Plan of Care  [x]  See progress note/recertification 2/5/24  []  See Discharge Summary         Progress towards goals / Updated goals: All goals reviewed and progressing appropriately as of 2/27/2017  1. Patient will increase FOTO score to 68/100 for indications of increased functional mobility to be assessed NV 2/272/17   2. Patient will demo 100% bridge to indicate improved core strength goal not met - 50% bridge with pain 2/27/17   3. Patient will report 60% improvement in sx for progression to PLOF    4.   Patient will report walking tolerance to 2 miles for increased community mobility          PLAN  [x]  Upgrade activities as tolerated     []  Continue plan of care  [x]  Update interventions per flow sheet       []  Discharge due to:_  [x]  Other: PN DUE AIDEE Back, PT 2/27/2017  6:31 AM

## 2017-03-03 ENCOUNTER — APPOINTMENT (OUTPATIENT)
Dept: PHYSICAL THERAPY | Age: 42
End: 2017-03-03
Payer: COMMERCIAL

## 2017-03-06 ENCOUNTER — HOSPITAL ENCOUNTER (OUTPATIENT)
Dept: PHYSICAL THERAPY | Age: 42
Discharge: HOME | End: 2017-03-06
Payer: COMMERCIAL

## 2017-03-06 PROCEDURE — 97140 MANUAL THERAPY 1/> REGIONS: CPT

## 2017-03-06 PROCEDURE — 97014 ELECTRIC STIMULATION THERAPY: CPT

## 2017-03-06 NOTE — PROGRESS NOTES
PT DAILY TREATMENT NOTE 8-    Patient Name: Geri Lopez  Date:3/6/2017  : 1975  [x]  Patient  Verified  Payor: Latrice Adkins / [place] [employer]  [facility] / Product Type: Commerical /    In time: 858 Out time:951  Total Treatment Time (min): 53  Visit #: 1 of -    Treatment Area: Chronic back pain [M54.9, G89.29]    SUBJECTIVE  Pain Level (0-10 scale):5/10  Any medication changes, allergies to medications, adverse drug reactions, diagnosis change, or new procedure performed?: [x] No    [] Yes (see summary sheet for update)  Subjective functional status/changes:   [] No changes reported  \"I am just in pain\"    OBJECTIVE  Modality rationale: decrease pain and increase tissue extensibility to improve the patients ability to decrease myofascial restriction to decrease pain with activity    Min Type Additional Details   10 [x] Estim:IFC  []w/US   []w/ice   [x]w/heat  Position: SL  Location:L QL/ hip flexor    [x] Skin assessment post-treatment:  [x]intact [x]redness- no adverse reaction       []redness - adverse reaction:      Procedure: An intramuscular manual therapy (dry needling) and a neuro-muscular re-education treatment was done to deactivate myofascial trigger points with a 30 gauge filament needle under aseptic technique.     Indications:  [x] Myofascial pain and dysfunction [x] Muscled spasms  [x] Myalgia/myositis   [x] Muscle cramps  [x] Muscle imbalances   [] TMD  [] Other:  Time Out Performed (verify consent and verify the right body part): 3/6/2017  Time: 306  Informed consent obtained: [x] Verbal [] Written    The following items were reviewed with the patient:  [x] Purpose of dry needling, side effects, possible complications and the informed consent  [x] The need to report the use of blood thinners and/or immunosuppressant medications  [x] How to respond to possible adverse effects of treatment  [x] Self treatment of post needling soreness: heat, (moist heat, heat wraps) and stretching  [x] Opportunity was given to ask any questions, all questions were answered    NT min Therapeutic Exercise:  [x] See flow sheet :    Rationale: increase ROM, increase strength and improve flexibility and core stability  to improve the patients ability to decreased pain with activity and progress to PLOF     43 min Manual Therapy: reassess, MET for R ant rotation, supine pelvic compression, IMT-L iliacus and psoas major   Rationale: increase ROM, increase tissue extensibility and decrease trigger points to decrease myofascial restrictions contributing to pain    x min Patient Education: [x] Review HEP    [x] IMT consent and post needling care to include heat, stretching/ exercise and prescribed pain meds as needed  MET  [facility]       Other Objective/Functional Measures:     Goals assessed for continuation   Pain at best 0, at worst 7  Subjective % improvement NT   Objective: LS AROM WNL with pain   Bridge / core 75% bridge with 7/10 pain    Forward flexion: end range L hypomobility    Long sit: L short to shorter   Improvements: limited sec to regression/ increase pain of unknown origin   Deficits x1 per day compliance with HEP, increased stress         Pain Level (0-10 scale) post treatment: 4    ASSESSMENT/Changes in Function: SEE PN      Patient's response to today's treatment:  [x] LTR's  [] Muscle relaxation [] Pain Relief  [x] Post needling soreness [x] without complications  [] Increased ROM [] Decreased HA's  [] Decreased Tinnitus  [] Other:     Patient will continue to benefit from skilled PT services to modify and progress therapeutic interventions, address functional mobility deficits, address ROM deficits, address strength deficits, analyze and address soft tissue restrictions, analyze and cue movement patterns, analyze and modify body mechanics/ergonomics and assess and modify postural abnormalities to attain remaining goals.      []  See Plan of Care   [x]  See progress note/recertification 1/5/37  []  See Discharge Summary         Progress towards goals / Updated goals:  1. Patient will increase FOTO score to 68/100 for indications of increased functional mobility goal not met- FOTO decreased to 44/100    2. Patient will demo 100% bridge to indicate improved core strength goal progressing - 75% with 7/10 pain    3. Patient will report 60% improvement in sx for progression to PLOF goal not met    4.   Patient will report walking tolerance to 2 miles for increased community mobility goal not met - limited tolerance at this time          PLAN  [x]  Upgrade activities as tolerated     []  Continue plan of care  [x]  Update interventions per flow sheet       []  Discharge due to:_  [x]  Other: [id number] T[address] Mccracken, PT 3/6/2017  6:31 AM

## 2017-03-06 NOTE — PROGRESS NOTES
[address] [facility] AT [address] [place] [address] [place], [place]  Phone: [phone] Fax: [phone]  PROGRESS NOTE  Patient Name: Katie Collins : 1975   Treatment/Medical Diagnosis: Chronic back pain [M54.9, G89.29]   Referral Source: Rosy Bills MD     Date of Initial Visit: 17 Attended Visits: 6 Missed Visits: 1     SUMMARY OF TREATMENT  Patient is being treated for co LS pain starting approx 2 years ago after heavy lift with twisting of water bottle. Treatment has included IMT/ dry needling therapy, progressive therex and HV ES for pain relief  CURRENT STATUS  Patient seen for 9 visits over 2 mo period sec to work / schedule conflicts. Overall, patient was doing well until recent exacerbation of unknown origin about 2 weeks ago. Patient is presenting with cont myofascial trigger points of L pelvis causing pelvic obliquities and will be the continued focus of treatment. Assessment as follows:  Pain at best 0, at worst 7  Subjective % improvement NT   Objective: LS AROM WNL with pain  Bridge / core 75% bridge with 7/10 pain   Forward flexion: end range L hypomobility   Long sit: L short to shorter   Improvements: limited sec to regression/ increase pain of unknown origin   Deficits x1 per day compliance with HEP, increased stress  1. Patient will increase FOTO score to 68/100 for indications of increased functional mobility goal not met- FOTO decreased to 44/100    2. Patient will demo 100% bridge to indicate improved core strength goal progressing - 75% with 7/10 pain    3. Patient will report 60% improvement in sx for progression to PLOF goal not met    4.   Patient will report walking tolerance to 2 miles for increased community mobility goal not met - limited tolerance at this time       New Goals to be achieved in __4-12__  treatments:   cont per goals as above sec to un met   G-Codes: NA  RECOMMENDATIONS  Patient would benefit from continuation 1-2x per week for 4-12 sessions as needed to cont to address myofascial trigger points and address deficits as above. If you have any questions/comments please contact us directly at [phone]. Thank you for allowing us to assist in the care of your patient. Therapist Signature: Leora Hernandez PT Date: 3/6/2017     Time: 1048am    NOTE TO PHYSICIAN:  PLEASE COMPLETE THE ORDERS BELOW AND FAX TO   [facility]: [phone]. If you are unable to process this request in 24 hours please contact our office: [phone].  ___ I have read the above report and request that my patient continue as recommended.   ___ I have read the above report and request that my patient continue therapy with the following changes/special instructions:_________________________________________________________   ___ I have read the above report and request that my patient be discharged from therapy.      Physician Signature:        Date:       Time:

## 2017-03-10 ENCOUNTER — APPOINTMENT (OUTPATIENT)
Dept: PHYSICAL THERAPY | Age: 42
End: 2017-03-10
Payer: COMMERCIAL

## 2017-03-13 ENCOUNTER — APPOINTMENT (OUTPATIENT)
Dept: PHYSICAL THERAPY | Age: 42
End: 2017-03-13
Payer: COMMERCIAL

## 2017-03-15 NOTE — PROGRESS NOTES
[address] [facility] [address] [facility] AT [address] [place] [address] [place]  Phone: [phone] Fax: [phone] SUMMARY   Patient Name: Natalie Raymond : 1975   Treatment/Medical Diagnosis: Chronic back pain [M54.9, G89.29]   Referral Source: Bowen Patricio MD     Date of Initial Visit: 17 Attended Visits: 6 Missed Visits: 1     SUMMARY OF TREATMENT  Patient was being treated for co LS pain starting approx 2 years ago after heavy lift with twisting of water bottle. Treatment has included IMT/ dry needling therapy, progressive therex and HV ES for pain relief  CURRENT STATUS  Patient seen for 9 visits over 2 mo period sec to work / schedule conflicts. Overall, patient was doing well until recent exacerbation of unknown origin about 2 weeks ago. Patient is presenting with cont myofascial trigger points of L pelvis causing pelvic obliquities and will be the continued focus of treatment. Assessment as follows:  Pain at best 0, at worst 7  Subjective % improvement NT   Objective: LS AROM WNL with pain  Bridge / core 75% bridge with 7/10 pain   Forward flexion: end range L hypomobility   Long sit: L short to shorter   Improvements: limited sec to regression/ increase pain of unknown origin   Deficits x1 per day compliance with HEP, increased stress  1. Patient will increase FOTO score to 68/100 for indications of increased functional mobility goal not met- FOTO decreased to 44/100    2. Patient will demo 100% bridge to indicate improved core strength goal progressing - 75% with 7/10 pain    3. Patient will report 60% improvement in sx for progression to PLOF goal not met    4. Patient will report walking tolerance to 2 miles for increased community mobility goal not met - limited tolerance at this time      G-Codes: NA  RECOMMENDATIONS  PATIENT SELF DC SEC TO STRESS AT [address] AT THIS TIME  If you have any questions/comments please contact us directly at [phone]. Thank you for allowing us to assist in the care of your patient. Therapist Signature: Gabbie Brand PT Date: 3/15/2017     Time: 1048am    NOTE TO PHYSICIAN:  PLEASE COMPLETE THE ORDERS BELOW AND FAX TO   [facility]: [phone]. If you are unable to process this request in 24 hours please contact our office: [phone].  ___ I have read the above report and request that my patient continue as recommended.   ___ I have read the above report and request that my patient continue therapy with the following changes/special instructions:_________________________________________________________   ___ I have read the above report and request that my patient be discharged from therapy.      Physician Signature:        Date:       Time:

## 2017-03-22 ENCOUNTER — APPOINTMENT (OUTPATIENT)
Dept: PHYSICAL THERAPY | Age: 42
End: 2017-03-22
Payer: COMMERCIAL

## 2017-03-27 ENCOUNTER — APPOINTMENT (OUTPATIENT)
Dept: PHYSICAL THERAPY | Age: 42
End: 2017-03-27
Payer: COMMERCIAL

## 2017-12-08 ENCOUNTER — IMPORTED ENCOUNTER (OUTPATIENT)
Dept: URBAN - METROPOLITAN AREA CLINIC 1 | Facility: CLINIC | Age: 42
End: 2017-12-08

## 2017-12-08 PROBLEM — H10.45: Noted: 2017-12-08

## 2017-12-08 PROCEDURE — S0620 ROUTINE OPHTHALMOLOGICAL EXA: HCPCS

## 2017-12-08 NOTE — PATIENT DISCUSSION
1. Pain in/or around the eye-OU-Gave patient an updated Rx 2. Allergic Conjunctivitis OU :  Condition discussed with patient. Advised patient to use OTC Zaditor one drop OU BID prn.3. Return for an appointment in 1 year for 30. with Dr. Aicha Brady.

## 2022-04-02 ASSESSMENT — KERATOMETRY
OD_AXISANGLE2_DEGREES: 048
OD_AXISANGLE_DEGREES: 138
OS_K2POWER_DIOPTERS: 43.25
OD_K1POWER_DIOPTERS: 42.25
OD_K2POWER_DIOPTERS: 42.75
OS_AXISANGLE2_DEGREES: 125
OS_AXISANGLE_DEGREES: 035
OS_K1POWER_DIOPTERS: 42.75

## 2022-04-02 ASSESSMENT — VISUAL ACUITY
OS_CC: 20/20
OD_SC: J1+
OS_SC: J1+
OD_CC: 20/20

## 2022-04-02 ASSESSMENT — TONOMETRY
OD_IOP_MMHG: 16
OS_IOP_MMHG: 16

## 2022-05-04 NOTE — PROGRESS NOTES
PT DAILY TREATMENT NOTE     Patient Name: Suzy Peterson  Date:2017  : 1975  [x]  Patient  Verified  Payor: Viky Comment / Plan: [employer]  CAPITATED PT / Product Type: Commerical /    In time:1030 Out time:1121  Total Treatment Time (min): 51  Visit #: 5 of     Treatment Area: Chronic back pain [M54.9, G89.29]    SUBJECTIVE  Pain Level (0-10 scale):4  Any medication changes, allergies to medications, adverse drug reactions, diagnosis change, or new procedure performed?: [x] No    [] Yes (see summary sheet for update)  Subjective functional status/changes:   [] No changes reported  \"This was just a bad weekend. \"    OBJECTIVE  Modality rationale: decrease pain and increase tissue extensibility to improve the patients ability to decrease myofascial restriction to decrease pain with activity    Min Type Additional Details   10 [x] Estim: 4 lead HV  []w/US   []w/ice   [x]w/heat  Position: prone  Location: L TS and LS paraspinals    [x] Skin assessment post-treatment:  [x]intact [x]redness- no adverse reaction       []redness - adverse reaction:      Procedure: An intramuscular manual therapy (dry needling) and a neuro-muscular re-education treatment was done to deactivate myofascial trigger points with a 30 gauge filament needle under aseptic technique.     Indications:  [x] Myofascial pain and dysfunction [x] Muscled spasms  [x] Myalgia/myositis   [x] Muscle cramps  [x] Muscle imbalances   [] TMD  [] Other:  Time Out Performed (verify consent and verify the right body part): 2017  Time: 1030  Informed consent obtained: [x] Verbal [] Written    The following items were reviewed with the patient:  [x] Purpose of dry needling, side effects, possible complications and the informed consent  [x] The need to report the use of blood thinners and/or immunosuppressant medications  [x] How to respond to possible adverse effects of treatment  [x] Self treatment of post needling soreness: heat, (moist heat, [Dear  ___] : Dear  [unfilled], heat wraps) and stretching  [x] Opportunity was given to ask any questions, all questions were answered    21 min Therapeutic Exercise:  [x] See flow sheet :   Rationale: increase ROM, increase strength and improve flexibility and core stability  to improve the patients ability to decreased pain with activity and progress to PLOF     20 min Manual Therapy: IMT: L TS paraspinal / Iliocostalis thoracic at T10-11 level     Rationale: increase ROM, increase tissue extensibility and decrease trigger points to decrease myofascial restrictions contributing to pain    x min Patient Education: [x] Review HEP    [x] IMT consent and post needling care to include heat, stretching/ exercise and prescribed pain meds as needed  - added hip hike and BET - patient declined pictures      Other Objective/Functional Measures:     Patient not seen since 1/23 sec to illness    Added BET hip hinge and standing hip hike for lumbo pelvic control with transfers      Pain Level (0-10 scale) post treatment: 4 post needling soreness    ASSESSMENT/Changes in Function: Patient had excellent LTR of needled muscles today. Muscles needled higher than previously needled and co.  Patient reports possible \"overstretching\" with co discomfort with stretching - educated patient to hold all stretching and focus on stabilization therex only. Patient's response to today's treatment:  [x] LTR's  [] Muscle relaxation [] Pain Relief  [x] Post needling soreness [x] without complications  [] Increased ROM [] Decreased HA's  [] Decreased Tinnitus  [] Other:     Patient will continue to benefit from skilled PT services to modify and progress therapeutic interventions, address functional mobility deficits, address ROM deficits, address strength deficits, analyze and address soft tissue restrictions, analyze and cue movement patterns, analyze and modify body mechanics/ergonomics and assess and modify postural abnormalities to attain remaining goals.      [x] See Plan of Care  []  See progress note/recertification  []  See Discharge Summary         Progress towards goals / Updated goals: All goals reviewed and progressing appropriately as of 1/30/2017   · Short Term Goals: To be accomplished in 1 weeks:  1. Pt will be independent and compliant with HEP- Goal progressing - HEP est with no questions. HEP will be modified and progressed as appropriate 1/13/17  · Long Term Goals: To be accomplished in 8-12 treatments:  1. Patient will increase FOTO score to 68/100 for indications of increased functional mobility. 2. Patient will demo 100% bridge with pain less than 3/10 for improved core stability - goal progressing - bridge 50% without pain (50% with increased pain at IE)  1/23/17  3. Patient will demo equal pelvic ht in supine to indicate decreased QL tightness and pain   4.  Patient will report increased walking tolerance to 10 min with minimal pain for progression of activity tolerance    PLAN  [x]  Upgrade activities as tolerated     []  Continue plan of care  [x]  Update interventions per flow sheet       []  Discharge due to:_  [x]  Other:patient only has one more apt scheduled after today - will reassess for continuation    Added rib integrator AIDEE Centeno, PT 1/30/2017  6:31 AM [Consult Letter:] : I had the pleasure of evaluating your patient, [unfilled]. [Please see my note below.] : Please see my note below. [Consult Closing:] : Thank you very much for allowing me to participate in the care of this patient.  If you have any questions, please do not hesitate to contact me. [Sincerely,] : Sincerely,